# Patient Record
Sex: FEMALE | Race: WHITE | NOT HISPANIC OR LATINO | Employment: FULL TIME | URBAN - METROPOLITAN AREA
[De-identification: names, ages, dates, MRNs, and addresses within clinical notes are randomized per-mention and may not be internally consistent; named-entity substitution may affect disease eponyms.]

---

## 2021-06-24 ENCOUNTER — HOSPITAL ENCOUNTER (EMERGENCY)
Facility: HOSPITAL | Age: 36
Discharge: HOME | End: 2021-06-24
Attending: EMERGENCY MEDICINE | Admitting: EMERGENCY MEDICINE

## 2021-06-24 VITALS
HEART RATE: 94 BPM | RESPIRATION RATE: 16 BRPM | HEIGHT: 63 IN | OXYGEN SATURATION: 98 % | SYSTOLIC BLOOD PRESSURE: 112 MMHG | TEMPERATURE: 98.2 F | BODY MASS INDEX: 28.35 KG/M2 | DIASTOLIC BLOOD PRESSURE: 84 MMHG | WEIGHT: 160 LBS

## 2021-06-24 DIAGNOSIS — J34.89 SINUS PRESSURE: ICD-10-CM

## 2021-06-24 DIAGNOSIS — H93.8X1 EAR PRESSURE, RIGHT: ICD-10-CM

## 2021-06-24 DIAGNOSIS — J39.8 CONGESTION OF UPPER RESPIRATORY TRACT: Primary | ICD-10-CM

## 2021-06-24 DIAGNOSIS — T78.40XA ALLERGY, INITIAL ENCOUNTER: ICD-10-CM

## 2021-06-24 PROCEDURE — 99282 EMERGENCY DEPT VISIT SF MDM: CPT

## 2021-06-24 RX ORDER — CEFDINIR 300 MG/1
300 CAPSULE ORAL 2 TIMES DAILY
COMMUNITY

## 2021-06-24 RX ORDER — FLUTICASONE PROPIONATE 50 MCG
2 SPRAY, SUSPENSION (ML) NASAL DAILY
COMMUNITY

## 2021-06-24 RX ORDER — LEVOTHYROXINE AND LIOTHYRONINE 76; 18 UG/1; UG/1
120 TABLET ORAL DAILY
COMMUNITY

## 2025-03-30 ENCOUNTER — HOSPITAL ENCOUNTER (EMERGENCY)
Facility: HOSPITAL | Age: 40
Discharge: HOME | End: 2025-03-30
Attending: EMERGENCY MEDICINE | Admitting: EMERGENCY MEDICINE
Payer: MEDICAID

## 2025-03-30 VITALS
SYSTOLIC BLOOD PRESSURE: 108 MMHG | HEART RATE: 86 BPM | HEIGHT: 63 IN | RESPIRATION RATE: 16 BRPM | BODY MASS INDEX: 28.37 KG/M2 | DIASTOLIC BLOOD PRESSURE: 76 MMHG | TEMPERATURE: 98.5 F | WEIGHT: 160.1 LBS | OXYGEN SATURATION: 98 %

## 2025-03-30 DIAGNOSIS — L30.9 DERMATITIS OF FACE: Primary | ICD-10-CM

## 2025-03-30 PROCEDURE — 99282 EMERGENCY DEPT VISIT SF MDM: CPT

## 2025-03-31 NOTE — [facility] PROVIDER NOTE
"Subjective  History of Present Illness:    [Patient presents emerged part with a rash on the right side of her face.  Is located just lateral to the angle of the mouth.  It also extends to the philtrum.  It is small red raised without pustules or vesicles.  She was googling and saw that this could be syphilis.  Last actual encounter was in January.  Denies any vaginal lesions.  She states that the rash itself improved with castor oil and then returned.  Her son had angular cheilitis which was treated with mupirocin.  She is started putting that medication on this rash as well.  Symptoms.]    Nurses Notes reviewed and agree, including vitals, allergies, social history and prior medical history.     REVIEW OF SYSTEMS: All systems reviewed and not pertinent unless noted.    Past Medical History:   Diagnosis Date    Disease of thyroid gland        Allergies:    Patient has no known allergies.      Past Surgical History:   Procedure Laterality Date    KNEE ARTHROSCOPY      TONSILLECTOMY           Social History     Socioeconomic History    Marital status: Single   Tobacco Use    Smoking status: Some Days   Vaping Use    Vaping status: Never Used   Substance and Sexual Activity    Alcohol use: Yes     Comment: Occasional    Drug use: Never    Sexual activity: Yes         No family history on file.    Objective  Physical Exam:  /76 (BP Location: Left arm, Patient Position: Sitting)   Pulse 86   Temp 98.5 °F (36.9 °C) (Oral)   Resp 16   Ht 160 cm (63\")   Wt 72.6 kg (160 lb 1.6 oz)   LMP 03/13/2025 (Exact Date)   SpO2 98%   BMI 28.36 kg/m²      Physical Exam    [Primary Survey    Airway: [patent and protected]  Breathing: [symmetric bilaterally]  Circulation: [mentating well, responsive]        Constitutional: [Nontoxic appearance.]  Psychological: [ No abnormalities of mood affect.]  Head: [Atraumatic]  Eyes: [Conjunctiva are non-injected. no scleral icterus.]  ENT: [no obvious congestion or obstruction noted " ]  Neck: [no obvious deformity]. [ROM appears preserved]  Chest: [no deformity noted.] [No paradoxical breathing noted]  Respiratory: [Respiratory effort was normal - no use of accessory respiratory muscles noted.] [ There is no stridor.] []  Cardiovascular:  [perfusion appears preserved - mentating well]   Gastrointestinal: [Abdomen nondistended. ]  []  Genitourinary: [Not examined]  Lymphatic: [Not examined]  Back: [not examined]  Musculoskeletal: [Musculoskeletal system is grossly intact.  There is no obvious deformity.]  Neurological: Face: [No Asymmetry]. [ Gross motor movement is intact in all 4 extremities.] [Walks and ambulates without difficulty.]  [ Patient exhibits normal speech.]  Skin: [No Pallor] [No obvious bruising. ]  Patient has small amount of rash on the right side of her face just lateral to the angle of the mouth extending to the philtrum.  There are a total of approximately 10 small erythematous blanching raised lesions.  They are nontender to palpation.  There is no intraoral involvement.  It is reminiscent of acne]      ED Course:      Lab Results (last 24 hours)       ** No results found for the last 24 hours. **             No radiology results from the last 24 hrs     No orders to display       Procedures    MDM      Patient has nonemergent dermatitis to the face.  Advised her against using hydrocortisone cream.  She has started mupirocin which I think is reasonable.  Overall I think this may be acne which could have been caused by the castor oil she was applying to her face.  Does not appear to be infectious or grossly inflammatory.  It is not causing her great discomfort.  I think should benefit from follow-up with dermatology which she is going to seek to her primary care.  This presentation is not consistent with a syphilis chancre.  I reassured the patient.  We discussed skin care and patient was discharged.          Medications - No data to display    HEART SCORE   No data  recorded           -----  ED Disposition       ED Disposition   Discharge    Condition   Stable    Comment   --             Final diagnoses:   Dermatitis of face      Your Follow-Up Providers       Schedule an appointment as soon as possible for a visit  with Nadja Bar MD.    Specialty: Family Medicine  Follow up details: As needed  [address] [place], [place] [id number] [phone]                       Contact information for after-discharge care    Follow-up information has not been specified.                    Your medication list        CONTINUE taking these medications        Instructions Last Dose Given Next Dose Due   cefdinir 300 MG capsule  Commonly known as: OMNICEF      Take 300 mg by mouth 2 (Two) Times a Day.       CLARITIN PO      Take  by mouth.       fluticasone 50 MCG/ACT nasal spray  Commonly known as: FLONASE      2 sprays into the nostril(s) as directed by provider Daily.       Thyroid 120 MG tablet  Commonly known as: ARMOUR      Take 120 mg by mouth Daily.